# Patient Record
Sex: MALE | Race: WHITE | URBAN - METROPOLITAN AREA
[De-identification: names, ages, dates, MRNs, and addresses within clinical notes are randomized per-mention and may not be internally consistent; named-entity substitution may affect disease eponyms.]

---

## 2021-11-05 ENCOUNTER — OFFICE VISIT (OUTPATIENT)
Dept: URBAN - METROPOLITAN AREA CLINIC 19 | Facility: CLINIC | Age: 61
End: 2021-11-05
Payer: COMMERCIAL

## 2021-11-05 ENCOUNTER — WEB ENCOUNTER (OUTPATIENT)
Dept: URBAN - METROPOLITAN AREA CLINIC 19 | Facility: CLINIC | Age: 61
End: 2021-11-05

## 2021-11-05 ENCOUNTER — DASHBOARD ENCOUNTERS (OUTPATIENT)
Age: 61
End: 2021-11-05

## 2021-11-05 ENCOUNTER — TELEPHONE ENCOUNTER (OUTPATIENT)
Dept: URBAN - METROPOLITAN AREA CLINIC 19 | Facility: CLINIC | Age: 61
End: 2021-11-05

## 2021-11-05 DIAGNOSIS — K21.9 GERD: ICD-10-CM

## 2021-11-05 DIAGNOSIS — I44.7 LBBB (LEFT BUNDLE BRANCH BLOCK): ICD-10-CM

## 2021-11-05 DIAGNOSIS — R13.10 DYSPHAGIA: ICD-10-CM

## 2021-11-05 DIAGNOSIS — Z12.11 SCREENING FOR COLON CANCER: ICD-10-CM

## 2021-11-05 PROBLEM — 53741008 CORONARY ARTERY DISEASE: Status: ACTIVE | Noted: 2021-11-05

## 2021-11-05 PROBLEM — 235595009 GASTROESOPHAGEAL REFLUX DISEASE: Status: ACTIVE | Noted: 2021-11-05

## 2021-11-05 PROBLEM — 275978004 SCREENING FOR COLON CANCER: Status: ACTIVE | Noted: 2021-11-05

## 2021-11-05 PROBLEM — 73430006 SLEEP APNEA: Status: ACTIVE | Noted: 2021-11-05

## 2021-11-05 PROBLEM — 63467002 LEFT BUNDLE BRANCH BLOCK: Status: ACTIVE | Noted: 2021-11-05

## 2021-11-05 PROBLEM — 13645005 COPD - CHRONIC OBSTRUCTIVE PULMONARY DISEASE: Status: ACTIVE | Noted: 2021-11-05

## 2021-11-05 PROBLEM — 40739000 DYSPHAGIA: Status: ACTIVE | Noted: 2021-11-05

## 2021-11-05 PROCEDURE — 99203 OFFICE O/P NEW LOW 30 MIN: CPT | Performed by: INTERNAL MEDICINE

## 2021-11-05 PROCEDURE — 99243 OFF/OP CNSLTJ NEW/EST LOW 30: CPT | Performed by: INTERNAL MEDICINE

## 2021-11-05 RX ORDER — ATORVASTATIN CALCIUM 80 MG/1
1 TABLET TABLET, FILM COATED ORAL ONCE A DAY
Status: ACTIVE | COMMUNITY

## 2021-11-05 RX ORDER — BISOPROLOL FUMARATE 5 MG/1
1 TABLET TABLET, FILM COATED ORAL ONCE A DAY
Status: ACTIVE | COMMUNITY

## 2021-11-05 RX ORDER — SODIUM, POTASSIUM,MAG SULFATES 17.5-3.13G
254 ML SOLUTION, RECONSTITUTED, ORAL ORAL ONCE
Qty: 1 KIT | Refills: 0 | OUTPATIENT
Start: 2021-11-05 | End: 2021-11-06

## 2021-11-05 NOTE — HPI-TODAY'S VISIT:
Patient presents primarily as a referral from Dr. Spencer Trejo for a screening colonoscopy - he had one about 10 years ago that was normal.  A copy of this note will be sent to the referring provider.  He submitted a direct access questionnaire; however, he was red flagged due to questions about his cardiac history.  He apparently was diagnosed with a LBBB back in July 2021, and a subsequent cardiac catheterization was showed non-significant coronary artery disease.  No stents placed - to be managed medically.  Dr. Parker is his cardiologist. He has mild sleep apnea and COPD - not on COPD or CPAP.  However, upon further questioning, the patient complained of long-standing reflux, and he had noticed recent dysphagia/odynophagia.  He would like this further evaluated.

## 2021-11-09 ENCOUNTER — TELEPHONE ENCOUNTER (OUTPATIENT)
Dept: URBAN - METROPOLITAN AREA CLINIC 19 | Facility: CLINIC | Age: 61
End: 2021-11-09